# Patient Record
Sex: FEMALE | Race: NATIVE HAWAIIAN OR OTHER PACIFIC ISLANDER
[De-identification: names, ages, dates, MRNs, and addresses within clinical notes are randomized per-mention and may not be internally consistent; named-entity substitution may affect disease eponyms.]

---

## 2019-06-02 ENCOUNTER — HOSPITAL ENCOUNTER (INPATIENT)
Dept: HOSPITAL 5 - LD | Age: 29
LOS: 7 days | Discharge: HOME | End: 2019-06-09
Attending: OBSTETRICS & GYNECOLOGY | Admitting: OBSTETRICS & GYNECOLOGY
Payer: COMMERCIAL

## 2019-06-02 DIAGNOSIS — K66.0: ICD-10-CM

## 2019-06-02 DIAGNOSIS — K21.9: ICD-10-CM

## 2019-06-02 DIAGNOSIS — D64.9: ICD-10-CM

## 2019-06-02 DIAGNOSIS — O34.211: ICD-10-CM

## 2019-06-02 DIAGNOSIS — E66.01: ICD-10-CM

## 2019-06-02 DIAGNOSIS — Z3A.33: ICD-10-CM

## 2019-06-02 LAB
HCT VFR BLD CALC: 36.5 % (ref 30.3–42.9)
HGB BLD-MCNC: 12.9 GM/DL (ref 10.1–14.3)
MCHC RBC AUTO-ENTMCNC: 35 % (ref 30–34)
MCV RBC AUTO: 93 FL (ref 79–97)
PLATELET # BLD: 183 K/MM3 (ref 140–440)
RBC # BLD AUTO: 3.91 M/MM3 (ref 3.65–5.03)

## 2019-06-02 PROCEDURE — 82962 GLUCOSE BLOOD TEST: CPT

## 2019-06-02 PROCEDURE — 86592 SYPHILIS TEST NON-TREP QUAL: CPT

## 2019-06-02 PROCEDURE — 80053 COMPREHEN METABOLIC PANEL: CPT

## 2019-06-02 PROCEDURE — C1765 ADHESION BARRIER: HCPCS

## 2019-06-02 PROCEDURE — 76819 FETAL BIOPHYS PROFIL W/O NST: CPT

## 2019-06-02 PROCEDURE — 86850 RBC ANTIBODY SCREEN: CPT

## 2019-06-02 PROCEDURE — 85018 HEMOGLOBIN: CPT

## 2019-06-02 PROCEDURE — 76820 UMBILICAL ARTERY ECHO: CPT

## 2019-06-02 PROCEDURE — A4649 SURGICAL SUPPLIES: HCPCS

## 2019-06-02 PROCEDURE — 86901 BLOOD TYPING SEROLOGIC RH(D): CPT

## 2019-06-02 PROCEDURE — 85027 COMPLETE CBC AUTOMATED: CPT

## 2019-06-02 PROCEDURE — 85025 COMPLETE CBC W/AUTO DIFF WBC: CPT

## 2019-06-02 PROCEDURE — 76816 OB US FOLLOW-UP PER FETUS: CPT

## 2019-06-02 PROCEDURE — 85014 HEMATOCRIT: CPT

## 2019-06-02 PROCEDURE — 88307 TISSUE EXAM BY PATHOLOGIST: CPT

## 2019-06-02 PROCEDURE — 36415 COLL VENOUS BLD VENIPUNCTURE: CPT

## 2019-06-02 PROCEDURE — C9250 ARTISS FIBRIN SEALANT: HCPCS

## 2019-06-02 PROCEDURE — 83036 HEMOGLOBIN GLYCOSYLATED A1C: CPT

## 2019-06-02 PROCEDURE — 86900 BLOOD TYPING SEROLOGIC ABO: CPT

## 2019-06-02 RX ADMIN — VALACYCLOVIR HYDROCHLORIDE SCH MG: 500 TABLET, FILM COATED ORAL at 23:35

## 2019-06-02 RX ADMIN — ERYTHROMYCIN SCH MG: 250 TABLET, DELAYED RELEASE ORAL at 19:41

## 2019-06-02 RX ADMIN — AMPICILLIN SCH MLS/HR: 2 INJECTION, POWDER, FOR SOLUTION INTRAVENOUS at 19:46

## 2019-06-02 RX ADMIN — SODIUM CHLORIDE, SODIUM LACTATE, POTASSIUM CHLORIDE, AND CALCIUM CHLORIDE SCH MLS/HR: .6; .31; .03; .02 INJECTION, SOLUTION INTRAVENOUS at 19:44

## 2019-06-02 NOTE — HISTORY AND PHYSICAL REPORT
History of Present Illness


Date of examination: 19


Date of admission: 


19 16:00





Chief complaint: 


Leaking of fluid from vagina





History of present illness: 


28 year old  presents to L&D complaining of leaking of clear fluid from 

vagina for past hour. Patient denies contractions. Patient denies vaginal 

bleeding. Patient reports active fetal movement. 


Patient received prenatal care at [facility] and was able to look up 

prenatal records. 


LMP 18. EDC 19. 


Pregnancy significant for the following: Obesity (saw [facility]), history of 4 

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 

history of congenital heart disease, gestational diabetes (diet controlled per 

patient but not well controlled per sugar records on chart.)


Prenatal labs are as follows: A+, antibody screen negative, rubella immune, 

hepatitis B surface antigen negative, HIV negative, RPR nonreactive, quad screen

negative, GC negative, CT negative, GBS unknown (not done yet), 1 hour sugar 

test 205, HSV 2 positive serology. 








Past History


Past Medical History: other (obesity)


Past Surgical History:  section (history of  section times 4.)


GYN History: herpes (patient denies lesions or prodromal symptoms).  denies: 

chlamydia, gonorrhea, hepatitis B, HIV, syphilis, trichomonas


Family/Genetic History: diabetes, congenital heart defect


Social history: lives with family, full code.  denies: smoking, alcohol abuse, 

prescription drug abuse, IV drug use





- Obstetrical History


Expected Date of Delivery: 19


Actual Gestation: 33 Week(s) 3 Day(s) 


: 5


Para: 4


Hx # Term Pregnancies: 4


Number of  Pregnancies: 0


Spontaneous Abortions: 0


Induced : 0


Number of Living Children: 4





Medications and Allergies


                                    Allergies











Allergy/AdvReac Type Severity Reaction Status Date / Time


 


No Known Allergies Allergy   Verified 14 04:18











                                Home Medications











 Medication  Instructions  Recorded  Confirmed  Last Taken  Type


 


Butalbit/Acetamin/Caff/Codeine 1 each PO Q6HR #20 capsule 14  Unknown Rx





[Fioricet-Cod -08-30 Cap]     


 


Promethazine [Phenergan] 25 mg PO Q6H PRN #20 tablet 14  Unknown Rx


 


Sulfamethoxazole/Trimethoprim 1 each PO BID #14 tablet 14  Unknown Rx





[Bactrim Ds]     











Active Meds: 


Active Medications





Betamethasone Acet/Betameth SodPhos (Celestone Soluspan)  12 mg IM Q24HR MARVA


Diphenhydramine HCl (Benadryl)  12.5 mg IV Q2H PRN


   PRN Reason: Itching


Erythromycin (Shashi-Tab)  250 mg PO Q6HR MARVA


Hydromorphone HCl (Dilaudid)  0.5 mg IV Q5M PRN


   PRN Reason: Breakthrough Pain


Hydromorphone HCl (Dilaudid)  0.5 mg IV Q4H PRN


   PRN Reason: breakthrough pain > 7/10


Lactated Ringer's (Lactated Ringers)  1,000 mls @ 125 mls/hr IV AS DIRECT MARVA


Ampicillin Sodium (Ampicillin/Ns 2 Gm/100 Ml)  2 gm in 100 mls @ 100 mls/hr IV 

Q6HR MARVA; Protocol


Naloxone HCl (Narcan 0.4 Mg/1 Ml)  0.2 mg IV Q2MIN PRN


   PRN Reason: Res Rate </= 8 or 02 SAT < 92%


Promethazine HCl (Phenergan)  25 mg PO Q6H PRN


   PRN Reason: Nausea And Vomiting


Promethazine HCl (Phenergan)  25 mg IN Q6H PRN


   PRN Reason: Nausea And Vomiting


Sodium Chloride (Sodium Chloride Flush Syringe 10 Ml)  10 ml IV PRN NR


   Stop: 19 16:59











Review of Systems


All systems: negative (leaking of clear fluid from vagina)





- Physical Exam


Abdomen: Positive: normal appearance, soft.  Negative: distention, tenderness, 

guarding, rigidity


Genitourinary (Female): Positive: normal external genitalia, normal perenium.  

Negative: perineal/vulvar lesions (no lesions seen on careful exam with bright 

light upon admission)


Vagina: Positive: other (Large amount of clear fluid seen pooling in vagina on 

SSE.)


Uterus: Positive: enlarged.  Negative: tender


Anus/Rectum: Positive: normal perianal skin


Extremities: Positive: normal.  Negative: tenderness





- Obstetrical


FHR: category 1


Uterine Contraction Monitor Mode: External


Cervical Dilatation: 0


Cervical Effacement Percentage: 30


Fetal station: -4


Uterine Contraction Pattern: Irregular


Uterine Contraction Intensity: Mild





Results


                              Abnormal lab results











  19 Range/Units





  16:47 


 


POC Glucose  149 H  ()  








All other labs normal.








Assessment and Plan


A: Pregnancy at 33 weeks, 3 days gestation.


Spontaneous rupture of membranes, clear fluid. 


4 previous  sections.


Not in active labor.


GBS unknown.


Gestational diabetic, diet controlled.


HSV 2 serology positive, with no lesions or prodromal symptoms. 


Obesity.


Family history of congenital heart defect.


P: Admit. 


IV Ampicillin and Erythromycin (po Erythromycin substituted as no IV 

Erythromycin available at hospital). 


Valtrex suppression of HSV.


Celestone IM every 24 hours times 2 doses.


Blood glucose monitoring every 4 hours.


NPO. 


Consulted with Dr. Nieto re: this patient and informed him of patient's 

exam/SROM/history of C/S X4 and orders put in).

## 2019-06-02 NOTE — ANESTHESIA CONSULTATION
Anesthesia Consult and Med Hx


Date of service: 06/02/19





- Airway


Anesthetic Teeth Evaluation: Chipped


ROM Head & Neck: Adequate


Mental/Hyoid Distance: Adequate


Mallampati Class: Class III


Intubation Access Assessment: Probably Good





- Pulmonary Exam


CTA: Yes





- Cardiac Exam


Cardiac Exam: RRR





- Pre-Operative Health Status


ASA Pre-Surgery Classification: ASA3


Proposed Anesthetic Plan: Spinal





- Pulmonary


Hx Smoking: No


Hx Asthma: No


Hx Respiratory Symptoms: No


SOB: No


COPD: No


Home Oxygen Therapy: No


Hx Pneumonia: No


Hx Sleep Apnea: No





- Cardiovascular System


Hx Hypertension: No


Hx Coronary Artery Disease: No


Hx Heart Attack/AMI: No


Hx Angina: No


Hx Percutaneous Transluminal Coronary Angioplasty (PTCA): No


Hx Cardia Arrhythmia: No


Hx Pacemaker: No


Hx Internal Defibrillator: No


Hx Valvular Heart Disease: No


Hx Heart Murmur: No


Hx Peripheral Vascular Disease: No





- Central Nervous System


Hx Neuromuscular Disorder: No


Hx Seizures: No


CVA: No


Hx Back Pain: Yes


Hx Psychiatric Problems: No





- Gastrointestinal


Hx Ulcer: No


Hx Gastroesophageal Reflux Disease: Yes





- Endocrine


Hx Renal Disease: No


Hx End Stage Renal Disease: No


Hx Liver Disease: No


Hx Insulin Dependent Diabetes: No


Hx Non-Insulin Dependent Diabetes: Yes (gestational DM )


Hx Thyroid Disease: No


Hx Hypothyroidism: No


Hx Hyperthyroidism: No





- Hematic


Hx Anemia: No


Hx Sickle Cell Disease: No





- Other Systems


Hx Alcohol Use: No


Hx Substance Use: No


Hx Cancer: No


Hx Obesity: Yes (BMI 39.8)

## 2019-06-02 NOTE — ANESTHESIA DAY OF SURGERY
Anesthesia Day of Surgery





- Day of Surgery


Patient Examined: Yes


Patient H&P Reviewed: Yes


Patient is NPO: No (last meal 2pm)


Beta Blockers: No


Cardiac Clearance: No


Pulmonary Clearance: No


Arian's Test: N/A (pt not nicolas will hold on csection)

## 2019-06-03 RX ADMIN — ERYTHROMYCIN SCH MG: 250 TABLET, DELAYED RELEASE ORAL at 01:35

## 2019-06-03 RX ADMIN — SODIUM CHLORIDE, SODIUM LACTATE, POTASSIUM CHLORIDE, AND CALCIUM CHLORIDE SCH MLS/HR: .6; .31; .03; .02 INJECTION, SOLUTION INTRAVENOUS at 14:23

## 2019-06-03 RX ADMIN — AMPICILLIN SCH MLS/HR: 2 INJECTION, POWDER, FOR SOLUTION INTRAVENOUS at 01:40

## 2019-06-03 RX ADMIN — AMPICILLIN SCH MLS/HR: 2 INJECTION, POWDER, FOR SOLUTION INTRAVENOUS at 14:24

## 2019-06-03 RX ADMIN — ERYTHROMYCIN SCH MG: 250 TABLET, DELAYED RELEASE ORAL at 18:00

## 2019-06-03 RX ADMIN — ERYTHROMYCIN SCH MG: 250 TABLET, DELAYED RELEASE ORAL at 08:31

## 2019-06-03 RX ADMIN — SODIUM CHLORIDE, SODIUM LACTATE, POTASSIUM CHLORIDE, AND CALCIUM CHLORIDE SCH MLS/HR: .6; .31; .03; .02 INJECTION, SOLUTION INTRAVENOUS at 05:28

## 2019-06-03 RX ADMIN — VALACYCLOVIR HYDROCHLORIDE SCH MG: 500 TABLET, FILM COATED ORAL at 10:26

## 2019-06-03 RX ADMIN — ERYTHROMYCIN SCH MG: 250 TABLET, DELAYED RELEASE ORAL at 17:26

## 2019-06-03 RX ADMIN — AMPICILLIN SCH MLS/HR: 2 INJECTION, POWDER, FOR SOLUTION INTRAVENOUS at 08:31

## 2019-06-03 NOTE — ULTRASOUND REPORT
OB ULTRASOUND



History: Ruptured membranes.



Technique: Transabdominal ultrasound with Doppler interrogation.





Gestation: Single



Fetal Position: Breech



Amniotic Fluid: Decreased

  ZOE = 4.4 cm



Placenta: Anterior

  Placental Grade: 2



Fetal Heart Rate:

  166 BPM





BPD: 8.3 cm = 33 w 2 d



 HC: 30.9 cm = 34 w 3 d



 AC: 29.1 cm = 33 w 1 d



 FL: 7.7 cm = 39 w 1 d



HC/AC Ratio: 1.06



Cephalic Index: 76.8



Estimated Fetal Weight: 2573 grams





Clinical age = 33 w 4 d      EDC: 7/18/19



US Gest. Age = 35 w 0 d      EDC: 7/8/19





IMPRESSION: Viable, single intrauterine pregnancy as described.

## 2019-06-03 NOTE — ULTRASOUND REPORT
ULTRASOUND BIOPHYSICAL PROFILE:



History: fetal well being



Technique:  Transabdominal ultrasound with Doppler interrogation.



2 - Fetal breathing movements



2 - Fetal movements



2 - Fetal posture and tone



2 - Qualitative amniotic fluid volume



6 - TOTAL SCORE OF POSSIBLE 8



Fetal Heart Rate (bpm) 146

## 2019-06-03 NOTE — CONSULTATION
History of Present Illness


Reason for consult: PROM (28 year old  33.4 weeks  presents to L&D 

complaining of leaking of clear fluid from vagina on 19   Patient denies 

contractions. Patient denies vaginal bleeding. Patient reports active fetal 

movement.   Patient denies temp, chills , and ABD pain .  Patient was last seen 

by [facility] on 19 )





Past History


Past Medical History: other (obesity)


Past Surgical History:  section (history of  section times 4.)


GYN History: herpes (patient denies lesions or prodromal symptoms).  denies: 

chlamydia, gonorrhea, hepatitis B, HIV, syphilis, trichomonas


Family/Genetic History: diabetes, congenital heart defect





- Obstetrical History


: 5





Medications and Allergies


                                    Allergies











Allergy/AdvReac Type Severity Reaction Status Date / Time


 


No Known Allergies Allergy   Verified 14 04:18











                                Home Medications











 Medication  Instructions  Recorded  Confirmed  Last Taken  Type


 


No Known Home Medications [No  19 Unknown History





Reported Home Medications]     











Active Meds: 


Active Medications





Betamethasone Acet/Betameth SodPhos (Celestone Soluspan)  12 mg IM Q24H MARVA


   Stop: 19 16:59


Diphenhydramine HCl (Benadryl)  12.5 mg IV Q2H PRN


   PRN Reason: Itching


Erythromycin (Shashi-Tab)  250 mg PO Q6HR MARVA


   Last Admin: 19 08:31 Dose:  250 mg


   Documented by: 


Hydromorphone HCl (Dilaudid)  0.5 mg IV Q5M PRN


   PRN Reason: Breakthrough Pain


Hydromorphone HCl (Dilaudid)  0.5 mg IV Q4H PRN


   PRN Reason: breakthrough pain > 7/10


Lactated Ringer's (Lactated Ringers)  1,000 mls @ 125 mls/hr IV AS DIRECT MARVA


   Last Admin: 19 05:28 Dose:  125 mls/hr


   Documented by: 


Ampicillin Sodium (Ampicillin/Ns 2 Gm/100 Ml)  2 gm in 100 mls @ 100 mls/hr IV 

Q6HR MARVA; Protocol


   Last Admin: 19 08:31 Dose:  100 mls/hr


   Documented by: 


Naloxone HCl (Narcan 0.4 Mg/1 Ml)  0.2 mg IV Q2MIN PRN


   PRN Reason: Res Rate </= 8 or 02 SAT < 92%


Promethazine HCl (Phenergan)  25 mg PO Q6H PRN


   PRN Reason: Nausea And Vomiting


Promethazine HCl (Phenergan)  25 mg TN Q6H PRN


   PRN Reason: Nausea And Vomiting


Sodium Chloride (Sodium Chloride Flush Syringe 10 Ml)  10 ml IV PRN NR


   Stop: 19 16:59


Valacyclovir HCl (Valtrex)  500 mg PO BID MARVA


   Last Admin: 19 10:26 Dose:  500 mg


   Documented by: 











Review of Systems


Constitutional: no fever, no chills


Eyes: deferred


Ears, nose, mouth and throat: deferred


Respiratory: no shortness of breath


Breasts: deferred


Gastrointestinal: no abdominal pain


Rectal Exam: deferred


Musculoskeletal: no low back pain


Integumentary: no rash


Neurological: no headaches


Endocrine: recent glucocorticoid use (BMZ for FLM ), other (GDM under ADA diet )





- Vital Signs


Vital signs: 


                                   Vital Signs











Pulse BP


 


 84   119/79 


 


 19 19:34  19 19:34








                                        











Temp Pulse Resp BP Pulse Ox


 


 98.6 F   90   18   107/62    


 


 19 08:36  19 12:31  19 08:36  19 12:31   














- Physical Exam


Breasts: Positive: deferred


Cardiovascular: Regular rate


Lungs: Positive: Normal air movement


Abdomen: Positive: other (gravid).  Negative: tenderness, guarding


Uterus: Negative: tender


Deep Tendon Reflex Grade: Normal +2





- Obstetrical


FHR: category 1


Uterine Contraction Monitor Mode: External


Uterine Contraction Pattern: Absent





Results


Result Diagrams: 


                                 19 17:19





                              Abnormal lab results











  19 Range/Units





  16:47 17:19 22:12 


 


MCH   33 H   (28-32)  pg


 


MCHC   35 H   (30-34)  %


 


POC Glucose  149 H   147 H  ()  














  19 Range/Units





  01:57 02:05 08:39 


 


MCH     (28-32)  pg


 


MCHC     (30-34)  %


 


POC Glucose  128 H  127 H  126 H  ()  








All other labs normal.





Ultrasound: report reviewed (see [facility] US for full report )





Assessment and Plan





A: 


Pregnancy at 33 weeks, 4 days gestation.


Last [facility] appointment was 2 months ago ( 19) 


19 Spontaneous rupture of membranes ( assessed by [facility] ) 


NO sxs of chorio 


4 previous  sections.


Not in active labor.


GBS unknown.


Gestational diabetic overall adequate glycemic control under ADA diet 


HSV 2 serology positive, with no reported  lesions or prodromal symptoms. 


Morbid obesity 


Family history of congenital heart defect.


[facility] BPP assessment  however  was  noted ( ZOE measurement was not noted )




BMZ in progress 


ABX for latency 








P: 


In agreement with admission 


Continuous fetal monitoring 


Please call radiology to amend report 


NICU consult 


Obtain HgbA1C , CMP 


Complete BMZ for FLM 


Continue ABX 


2200 ADA diet 


Obtain FBS and PP glycemic levels 


IV Ampicillin and Erythromycin (po Erythromycin substituted as no IV 

Erythromycin available at hospital). 


Valtrex suppression of HSV.


Celestone IM every 24 hours times 2 doses.


As per ACOG timing of PPROM @ 34 weeks or with concern for chorio , fetal heart 

rate abnormalities , or standard ob indications

## 2019-06-03 NOTE — ULTRASOUND REPORT
ULTRASOUND OB VELOCIMETRY UMBILICAL ARTERY



HISTORY: Ruptured membranes.



TECHNIQUE: Transabdominal ultrasound. Spectral Doppler interrogation 

was performed on 3 segments of the umbilical cord.



FINDINGS:



Fetal heart rate measures 146 beats per minute.



The spectral waveforms are normal and persistent. No evidence for loss 

or reversal of end-diastolic flow.



The resistive index average measures 0.51.



The systolic/diastolic ratio average measures 2.04.



IMPRESSION:

Umbilical cord Doppler within normal limits.

## 2019-06-03 NOTE — PROGRESS NOTE
Assessment and Plan





- Patient Problems


(1) 33 weeks gestation of pregnancy


Current Visit: Yes   Status: Acute   





(2)  premature rupture of membranes


Current Visit: Yes   Status: Acute   


Plan to address problem: 





Continue antibiotics for latency.


Second steroid dose due today.


Plan repeat CD at 34 weeks.  Will order [facility] consult and comanage with maternal 

fetal medicine. 


NICU consult








(3) Previous  section


Current Visit: Yes   Status: Acute   


Plan to address problem: 





HIGH RISK - Placenta Accreta due to multiple previous CD


Order US to evaluate placenta


[facility] consult 


Plan repeat CD with additional staff available, high risk for  

hysterectomy. Need 2 large bore IV needles, cell saver and blood products as 

indicated.








(4) GDM (gestational diabetes mellitus)


Current Visit: Yes   Status: Acute   


Plan to address problem: 





Monitor blood glucose


Fasting 127


If continues to be uncontrolled will start insulin regimen below


   AM: NPH 40units Regular 20units


   PM: NPH 15units Regular 15 units








Objective





- Vital Signs


Vital Signs: 


                               Vital Signs - 12hr











  19





  23:23 08:36


 


Temperature 96 F L 98.6 F


 


Pulse Rate 88 


 


Respiratory 14 18





Rate  


 


Blood Pressure 137/67 


 


Blood Pressure 137/67 





[Left]  














- Labs


Labs: 


                                  Abnormal Labs











  19





  16:47 17:19 22:12


 


MCH   33 H 


 


MCHC   35 H 


 


POC Glucose  149 H   147 H














  19





  01:57 02:05


 


MCH  


 


MCHC  


 


POC Glucose  128 H  127 H








                         Laboratory Results - last 24 hr











  19





  16:47 17:19 17:19


 


WBC   7.5 


 


RBC   3.91 


 


Hgb   12.9 


 


Hct   36.5 


 


MCV   93 


 


MCH   33 H 


 


MCHC   35 H 


 


RDW   14.0 


 


Plt Count   183 


 


POC Glucose  149 H  


 


Blood Type    A POSITIVE


 


ALLAN Antibody Screen    Negative














  19





  22:12 01:57 02:05


 


WBC   


 


RBC   


 


Hgb   


 


Hct   


 


MCV   


 


MCH   


 


MCHC   


 


RDW   


 


Plt Count   


 


POC Glucose  147 H  128 H  127 H


 


Blood Type   


 


ALLAN Antibody Screen

## 2019-06-04 LAB
ALBUMIN SERPL-MCNC: 3 G/DL (ref 3.9–5)
ALT SERPL-CCNC: 12 UNITS/L (ref 7–56)
BUN SERPL-MCNC: 7 MG/DL (ref 7–17)
BUN/CREAT SERPL: 23 %
CALCIUM SERPL-MCNC: 8.6 MG/DL (ref 8.4–10.2)
HEMOLYSIS INDEX: 0

## 2019-06-04 RX ADMIN — ERYTHROMYCIN SCH MG: 250 TABLET, DELAYED RELEASE ORAL at 06:22

## 2019-06-04 RX ADMIN — AMPICILLIN SCH MLS/HR: 2 INJECTION, POWDER, FOR SOLUTION INTRAVENOUS at 00:03

## 2019-06-04 RX ADMIN — ERYTHROMYCIN SCH MG: 250 TABLET, DELAYED RELEASE ORAL at 18:09

## 2019-06-04 RX ADMIN — AMPICILLIN SCH MLS/HR: 2 INJECTION, POWDER, FOR SOLUTION INTRAVENOUS at 12:12

## 2019-06-04 RX ADMIN — SODIUM CHLORIDE, SODIUM LACTATE, POTASSIUM CHLORIDE, AND CALCIUM CHLORIDE SCH MLS/HR: .6; .31; .03; .02 INJECTION, SOLUTION INTRAVENOUS at 10:12

## 2019-06-04 RX ADMIN — VALACYCLOVIR HYDROCHLORIDE SCH MG: 500 TABLET, FILM COATED ORAL at 10:09

## 2019-06-04 RX ADMIN — ERYTHROMYCIN SCH MG: 250 TABLET, DELAYED RELEASE ORAL at 00:03

## 2019-06-04 RX ADMIN — ERYTHROMYCIN SCH MG: 250 TABLET, DELAYED RELEASE ORAL at 22:03

## 2019-06-04 RX ADMIN — AMPICILLIN SCH MLS/HR: 2 INJECTION, POWDER, FOR SOLUTION INTRAVENOUS at 18:09

## 2019-06-04 RX ADMIN — VALACYCLOVIR HYDROCHLORIDE SCH MG: 500 TABLET, FILM COATED ORAL at 00:03

## 2019-06-04 RX ADMIN — VALACYCLOVIR HYDROCHLORIDE SCH MG: 500 TABLET, FILM COATED ORAL at 22:03

## 2019-06-04 RX ADMIN — SODIUM CHLORIDE, SODIUM LACTATE, POTASSIUM CHLORIDE, AND CALCIUM CHLORIDE SCH MLS/HR: .6; .31; .03; .02 INJECTION, SOLUTION INTRAVENOUS at 18:33

## 2019-06-04 RX ADMIN — VALACYCLOVIR HYDROCHLORIDE SCH MG: 500 TABLET, FILM COATED ORAL at 00:17

## 2019-06-04 RX ADMIN — AMPICILLIN SCH MLS/HR: 2 INJECTION, POWDER, FOR SOLUTION INTRAVENOUS at 06:21

## 2019-06-04 RX ADMIN — ERYTHROMYCIN SCH MG: 250 TABLET, DELAYED RELEASE ORAL at 12:12

## 2019-06-04 RX ADMIN — SODIUM CHLORIDE, SODIUM LACTATE, POTASSIUM CHLORIDE, AND CALCIUM CHLORIDE SCH MLS/HR: .6; .31; .03; .02 INJECTION, SOLUTION INTRAVENOUS at 00:01

## 2019-06-04 NOTE — PROGRESS NOTE
Assessment and Plan





ASSESSMENT


   As stated above this is a 28 year old patient at 33.5 weeks with  pr

emature rupture of membranes and no current clinical evidence to support in-

utero infection.  











SUGGESTED MANAGEMENT PLAN


1.   s/p Betamethasone x 2


2.   Ongoing psychiatry follow-up.


3.   s/p Magnesium prophylaxis - done


4.   Antibiotics for latency


5.   Induction of labor at 34 weeks if undelivered or earlier for obstetrical 

indication.


6.   Continued admission 


7.   Bedrest in left lateral semi-Dickens position


8.   Assessment of blood pressures and temperature as per floor protocol


9.   Follow fetal status with fetal heart rate monitoring 


10.   Repeat CBC PRN to rule out infection 


11.   Periodic re-evaluation of amniotic fluid volume 


12.   Continuous FHR surveillance when and if patient complains of contractions.


13.   Tocodynametry to rule out uterine activity.


14.   I would withhold tocolysis with an SVE of > 4 cm dilation


15.   Neonatology should be present if delivery becomes imminent and particular 

attention should be given to fetal intracranial assessment.


16.   [facility] to follow





*As per a reading of the ACOG technical bulletin weve indicated that patients 

with  premature rupture of membranes should generally be delivered by 

approximate 34 weeks gestation. Please note that a suggested specific timing 

refers to a more defined timing and should be individualized based on patient 

criteria.





   REFERENCE:  Medically indicated late- and early-term deliveries. 

Committee Opinion No. [id number]. American College of Obstetricians [employer] Gynec[employer]. 

Obstet Gynecol 2013;121:37010.





Thank you for allowing us to participate in the care of this patient.  We look 

forward to the opportunity to assist in her continued management.  If you have 

any questions, please contact our office at [phone].








                                              _________________________________ 


                                                       Ted Dorman M.D.





Subjective





- Subjective


Date of service: 19


Principal diagnosis: PPROM at 33+ weeks


Interval history: 





Thank you for your recent consultation regarding the above named patient.  As 

you are aware, she is a 28 year old patient  at 33.5 weeks for whom I recently 

provided a perinatology consultation due to  premature rupture of 

membranes, 





Today she reports occasionally having leaking of fluid











Objective





- Vital Signs


Vital Signs: 


                               Vital Signs - 12hr











  19





  06:28 06:32 07:29


 


Temperature  98.9 F 98.1 F


 


Pulse Rate 73  74


 


Respiratory   18





Rate   


 


Blood Pressure 104/61  108/60


 


Blood Pressure   108/60





[Left]   














  19





  09:46 12:09 14:20


 


Temperature 97.4 F L 98.1 F 98.2 F


 


Pulse Rate  105 H 


 


Respiratory  18 





Rate   


 


Blood Pressure  108/60 


 


Blood Pressure  108/60 





[Left]   














- Labs


Labs: 


                                  Abnormal Labs











  19





  16:47 17:19 22:12


 


MCH   33 H 


 


MCHC   35 H 


 


Potassium   


 


Carbon Dioxide   


 


Creatinine   


 


Glucose   


 


POC Glucose  149 H   147 H


 


Total Protein   


 


Albumin   














  19





  01:57 02:05 08:39


 


MCH   


 


MCHC   


 


Potassium   


 


Carbon Dioxide   


 


Creatinine   


 


Glucose   


 


POC Glucose  128 H  127 H  126 H


 


Total Protein   


 


Albumin   














  19





  14:36 18:39 19:58


 


MCH   


 


MCHC   


 


Potassium   


 


Carbon Dioxide   


 


Creatinine   


 


Glucose   


 


POC Glucose  160 H  177 H  152 H


 


Total Protein   


 


Albumin   














  19





  07:38 10:25 10:42


 


MCH   


 


MCHC   


 


Potassium    3.5 L


 


Carbon Dioxide    20 L


 


Creatinine    0.3 L


 


Glucose    170 H


 


POC Glucose  146 H  163 H 


 


Total Protein    6.1 L


 


Albumin    3.0 L














  19





  14:30


 


MCH 


 


MCHC 


 


Potassium 


 


Carbon Dioxide 


 


Creatinine 


 


Glucose 


 


POC Glucose  132 H


 


Total Protein 


 


Albumin 








                         Laboratory Results - last 24 hr











  19





  18:39 19:58 07:38


 


Sodium   


 


Potassium   


 


Chloride   


 


Carbon Dioxide   


 


Anion Gap   


 


BUN   


 


Creatinine   


 


Estimated GFR   


 


BUN/Creatinine Ratio   


 


Glucose   


 


POC Glucose  177 H  152 H  146 H


 


Hemoglobin A1c   


 


Calcium   


 


Total Bilirubin   


 


AST   


 


ALT   


 


Alkaline Phosphatase   


 


Total Protein   


 


Albumin   


 


Albumin/Globulin Ratio   














  19





  10:25 10:42 10:42


 


Sodium   137 


 


Potassium   3.5 L 


 


Chloride   104.8 


 


Carbon Dioxide   20 L 


 


Anion Gap   16 


 


BUN   7 


 


Creatinine   0.3 L 


 


Estimated GFR   > 60 


 


BUN/Creatinine Ratio   23 


 


Glucose   170 H 


 


POC Glucose  163 H  


 


Hemoglobin A1c    5.3


 


Calcium   8.6 


 


Total Bilirubin   0.30 


 


AST   12 


 


ALT   12 


 


Alkaline Phosphatase   90 


 


Total Protein   6.1 L 


 


Albumin   3.0 L 


 


Albumin/Globulin Ratio   1.0 














  19





  14:30


 


Sodium 


 


Potassium 


 


Chloride 


 


Carbon Dioxide 


 


Anion Gap 


 


BUN 


 


Creatinine 


 


Estimated GFR 


 


BUN/Creatinine Ratio 


 


Glucose 


 


POC Glucose  132 H


 


Hemoglobin A1c 


 


Calcium 


 


Total Bilirubin 


 


AST 


 


ALT 


 


Alkaline Phosphatase 


 


Total Protein 


 


Albumin 


 


Albumin/Globulin Ratio

## 2019-06-05 RX ADMIN — ERYTHROMYCIN SCH MG: 250 TABLET, DELAYED RELEASE ORAL at 12:13

## 2019-06-05 RX ADMIN — SODIUM CHLORIDE, SODIUM LACTATE, POTASSIUM CHLORIDE, AND CALCIUM CHLORIDE SCH MLS/HR: .6; .31; .03; .02 INJECTION, SOLUTION INTRAVENOUS at 07:31

## 2019-06-05 RX ADMIN — AMPICILLIN SCH MLS/HR: 2 INJECTION, POWDER, FOR SOLUTION INTRAVENOUS at 00:08

## 2019-06-05 RX ADMIN — ERYTHROMYCIN SCH MG: 250 TABLET, DELAYED RELEASE ORAL at 17:48

## 2019-06-05 RX ADMIN — AMPICILLIN SCH MLS/HR: 2 INJECTION, POWDER, FOR SOLUTION INTRAVENOUS at 12:14

## 2019-06-05 RX ADMIN — ERYTHROMYCIN SCH MG: 250 TABLET, DELAYED RELEASE ORAL at 06:05

## 2019-06-05 RX ADMIN — VALACYCLOVIR HYDROCHLORIDE SCH MG: 500 TABLET, FILM COATED ORAL at 22:51

## 2019-06-05 RX ADMIN — VALACYCLOVIR HYDROCHLORIDE SCH MG: 500 TABLET, FILM COATED ORAL at 10:42

## 2019-06-05 RX ADMIN — AMPICILLIN SCH MLS/HR: 2 INJECTION, POWDER, FOR SOLUTION INTRAVENOUS at 06:04

## 2019-06-05 RX ADMIN — AMPICILLIN SCH MLS/HR: 2 INJECTION, POWDER, FOR SOLUTION INTRAVENOUS at 17:47

## 2019-06-05 RX ADMIN — SODIUM CHLORIDE, SODIUM LACTATE, POTASSIUM CHLORIDE, AND CALCIUM CHLORIDE SCH MLS/HR: .6; .31; .03; .02 INJECTION, SOLUTION INTRAVENOUS at 17:48

## 2019-06-05 NOTE — PROGRESS NOTE
Assessment and Plan





- Patient Problems


(1) 33 weeks gestation of pregnancy


Onset Date: 19   Current Visit: Yes   Status: Acute   


Plan to address problem: 


A:  IUP @ 33 6/7 weeks


      PPROM - stable


      Breech


      Previous C Section x 4





 P:  Continue present management


      Deliver by C Section tomorrow.








(2) GDM (gestational diabetes mellitus)


Onset Date: 19   Current Visit: Yes   Status: Acute   


Qualifiers: 


   Gestational diabetes mellitus control: diet-controlled   Trimester: third 

trimester   Qualified Code(s): O24.410 - Gestational diabetes mellitus in 

pregnancy, diet controlled   





(3)  premature rupture of membranes


Onset Date: 19   Current Visit: Yes   Status: Acute   


Qualifiers: 


   PROM onset of labor timing: onset of labor more than 24 hours following 

rupture   Qualified Code(s): O42.119 -  premature rupture of membranes, 

onset of labor more than 24 hours following rupture, unspecified trimester   





(4) Previous  section


Onset Date: 19   Current Visit: Yes   Status: Chronic   





Subjective





- Subjective


Date of service: 19


Principal diagnosis: PPROM @ 33 6/7 weeks


Interval history: 





Pt is a 28 year old HF  who presented to L&D complaining of leaking of 

clear fluid from vagina 19. She denies contractions or vaginal bleeding. P

atient reports active fetal movement. She is scheduled for a Repeat C Section 

tomorrow @ 34 0/7 weeks.


Patient reports: loss of fluid, fetal movement normal, no new complaints, no 

vaginal bleeding, no contractions





Objective





- Vital Signs


Vital Signs: 


                               Vital Signs - 12hr











  19





  03:13 04:30 06:01


 


Temperature 97.9 F 98.0 F 


 


Pulse Rate   83


 


Respiratory   





Rate   


 


Blood Pressure   148/59


 


Blood Pressure   





[Left]   














  19





  06:02 06:03 07:32


 


Temperature  97.8 F 


 


Pulse Rate 78 78 69


 


Respiratory  18 





Rate   


 


Blood Pressure 123/75  106/64


 


Blood Pressure  123/75 





[Left]   














  19





  07:37 12:30


 


Temperature 98.2 F 


 


Pulse Rate 69 81


 


Respiratory 16 





Rate  


 


Blood Pressure  108/62


 


Blood Pressure 106/64 





[Left]  














- Exam


Abdomen: Present: normal appearance, soft


Uterus: Present: normal


FHR: category 1


Uterine Contraction Monitor Mode: External


Uterine Contraction Pattern: Absent





- Labs


Labs: 


                                  Abnormal Labs











  19





  16:47 17:19 22:12


 


MCH   33 H 


 


MCHC   35 H 


 


Potassium   


 


Carbon Dioxide   


 


Creatinine   


 


Glucose   


 


POC Glucose  149 H   147 H


 


Total Protein   


 


Albumin   














  19





  01:57 02:05 08:39


 


MCH   


 


MCHC   


 


Potassium   


 


Carbon Dioxide   


 


Creatinine   


 


Glucose   


 


POC Glucose  128 H  127 H  126 H


 


Total Protein   


 


Albumin   














  19





  14:36 18:39 19:58


 


MCH   


 


MCHC   


 


Potassium   


 


Carbon Dioxide   


 


Creatinine   


 


Glucose   


 


POC Glucose  160 H  177 H  152 H


 


Total Protein   


 


Albumin   














  19





  07:38 10:25 10:42


 


MCH   


 


MCHC   


 


Potassium    3.5 L


 


Carbon Dioxide    20 L


 


Creatinine    0.3 L


 


Glucose    170 H


 


POC Glucose  146 H  163 H 


 


Total Protein    6.1 L


 


Albumin    3.0 L














  19





  14:30 19:12 08:07


 


MCH   


 


MCHC   


 


Potassium   


 


Carbon Dioxide   


 


Creatinine   


 


Glucose   


 


POC Glucose  132 H  152 H  115 H


 


Total Protein   


 


Albumin   














  19





  12:30


 


MCH 


 


MCHC 


 


Potassium 


 


Carbon Dioxide 


 


Creatinine 


 


Glucose 


 


POC Glucose  157 H


 


Total Protein 


 


Albumin 








                         Laboratory Results - last 24 hr











  19





  14:30 19:12 08:07


 


POC Glucose  132 H  152 H  115 H














  19





  12:30


 


POC Glucose  157 H














- Results


US- obstetric: report reviewed ([facility] ; Breech)

## 2019-06-06 LAB
BASOPHILS # (AUTO): 0 K/MM3 (ref 0–0.1)
BASOPHILS NFR BLD AUTO: 0.2 % (ref 0–1.8)
EOSINOPHIL # BLD AUTO: 0 K/MM3 (ref 0–0.4)
EOSINOPHIL NFR BLD AUTO: 0.1 % (ref 0–4.3)
HCT VFR BLD CALC: 33.3 % (ref 30.3–42.9)
HCT VFR BLD CALC: 35.4 % (ref 30.3–42.9)
HGB BLD-MCNC: 11.2 GM/DL (ref 10.1–14.3)
HGB BLD-MCNC: 12.1 GM/DL (ref 10.1–14.3)
LYMPHOCYTES # BLD AUTO: 2 K/MM3 (ref 1.2–5.4)
LYMPHOCYTES NFR BLD AUTO: 23.9 % (ref 13.4–35)
MCHC RBC AUTO-ENTMCNC: 34 % (ref 30–34)
MCV RBC AUTO: 94 FL (ref 79–97)
MONOCYTES # (AUTO): 0.6 K/MM3 (ref 0–0.8)
MONOCYTES % (AUTO): 7.4 % (ref 0–7.3)
PLATELET # BLD: 174 K/MM3 (ref 140–440)
RBC # BLD AUTO: 3.78 M/MM3 (ref 3.65–5.03)

## 2019-06-06 RX ADMIN — AMPICILLIN SCH MLS/HR: 2 INJECTION, POWDER, FOR SOLUTION INTRAVENOUS at 00:16

## 2019-06-06 RX ADMIN — SODIUM CHLORIDE, SODIUM LACTATE, POTASSIUM CHLORIDE, AND CALCIUM CHLORIDE SCH MLS/HR: .6; .31; .03; .02 INJECTION, SOLUTION INTRAVENOUS at 00:14

## 2019-06-06 RX ADMIN — ERYTHROMYCIN SCH MG: 250 TABLET, DELAYED RELEASE ORAL at 05:33

## 2019-06-06 RX ADMIN — ERYTHROMYCIN SCH MG: 250 TABLET, DELAYED RELEASE ORAL at 00:16

## 2019-06-06 RX ADMIN — AMPICILLIN SCH MLS/HR: 2 INJECTION, POWDER, FOR SOLUTION INTRAVENOUS at 05:34

## 2019-06-06 RX ADMIN — SODIUM CHLORIDE, SODIUM LACTATE, POTASSIUM CHLORIDE, AND CALCIUM CHLORIDE SCH MLS/HR: .6; .31; .03; .02 INJECTION, SOLUTION INTRAVENOUS at 08:57

## 2019-06-06 RX ADMIN — SODIUM CHLORIDE, SODIUM LACTATE, POTASSIUM CHLORIDE, AND CALCIUM CHLORIDE SCH MLS/HR: .6; .31; .03; .02 INJECTION, SOLUTION INTRAVENOUS at 08:08

## 2019-06-06 NOTE — OPERATIVE REPORT
Operative Report


Operative Report: 








DATE OF OPERATION   19





PREOP Diagnosis


1. 34 0/7 weeks gestation


2. Prelabor  rupture of membranes


3. Gestational diabetes


4. Morbid obesity


6. Previous  section x 4


7. Malpresentation





Postop Diagnosis


1. 34 0/7 weeks gestation


2. Prelabor  rupture of membranes


3. Gestational diabetes


4. Morbid obesity


6. Previous  section x 4


7. Malpresentation


8. Right hydrosalpinx





Procedure: Repeat transverse  section (classical)





Findings


1. Viable male infant in the breech presentationm  weighing 4lb 14 oz, 2206g 

APGARS 8 at 1 min, 9 at 5 min


2. Pelvic adhesions


3. Complete obliteration of left adnexa


4. Partial obliteration of right adnexa








Surgeon


1. Anisa Sanford MD





Anesthesia:


1. Epidural 





I/O:


EBL: 1300ml


Cell saver: 250ml (returned to patient)


UOP: 450ml, clear urine








Specimens removed:


1. Placenta - partially adhered to uterus, manually removed with uterine 

exploration





Complications: none





Disposition: Patient taken to recovery room in stable condition





INDICATIONS:


The patient is a 27yo  at 34 weeks that was managed for prelabor  

rupture of membranes.  She received antibiotics for latency and plan was to do 

repeat csection at 34 weeks per ACOG practice guidelines.  The patient was 

consented and the risks including but not limited to bleeding, infections, 

injury to surrounding organs, potential injury to mother/infant were discussed. 

Special emphasis was taken to express the increased risk of surgery after four 

previous abdominal surgereries.  Patient acknowledged understanding and all 

questions were answered and informed consent signed. 





She desired a tubal ligation and consent was signed 2019. The risk of pregnanc

y failure and ectopic pregnancy were discussed as well. 











PROCEDURE:


The patient was taken to OR 11 in stable condition.  Adequate anesthesia was 

achieved with epidural anesthesia. A la catheter was placed.  She wore SCDs 

for DVT prophylaxis.  And received Ancef for infection prophylaxis. The patient 

was prepped and draped in the usual fashion and an additional time out was done.

A Pfannestiel incision was made over the previous uterine scar.  The fascia was 

incised and the incision extended laterally.  The  superior and aspect of the 

rectus muscle was dissected off of the fascia.  The fascia inferior to the 

incision was densely scared to rectus an bladder.   Entry into the peritoneum 

was achieved. Due to dense pelvic adhesions the decision was made to extend the 

rectus muscle laterally to aide in visualization.  The uterus was encased in 

peritoneum adhered to omentum and bladder.  A transverse incision made made in 

the uterus and extended laterally.   The membranes were adherent to the fetal 

body and no amniotic fluid as noted.  The fetus was noted to be in breech 

presentation, The fetal lower extremities were delivered by sweeping the leg 

across the torso and out the body.  The same technique was implored to deliver 

the right extremity.  The torso was elevated out of the abdomen and the left 

upper extremity was delivered by sweeping down and out the uterus.  The head the

n spontaneously delivered.  The infant was bulb suctioned and delayed cord 

clamping was done for 45 seconds.  The infant was then handed over to the 

awaiting NICU staff. 





The placenta was delivered and a portion of placenta was noted to be adherent to

the uterus.  That portion of placenta was manually removed. The uterus was 

repaired in multiple figure of 8 and continuous sutures in multiple layers.  The

right tube was noted to be bleeding and dilated.  A Silvestre clamp was placed over

the left tube and the Ligasure Enseal device was used to clamp, cauterize and 

cut the tube to aid in hemostasis.  An attempt was made to remove the right tube

but it was densely adhered to the lateral peritoneum.  The right and left adnexa

were observed and it was noted that  due to pelvic adhesions there was no way to

safely do a bilateral tubal ligation 








The patient received Pitocin IV and Methergine 0.2mg IM.  Lyndsey, Surgicel and 

Tisseal fibrin sealant 20ml  were used to aid in hemostasis. 





The uterus was cleaned of all clots.  The uterus was repaired with 0-Vicryl in a

running, locked stitch and an imbricating layer of the same suture was used.  

Interceed was placed between the uterine and rectus muscle planes.  The fascia 

was closed with 0 Vicryl. Subcutaneous layer reapproximated with 2-0 Vicryl. 

Skin closed with 4-0 Vicryl.





The patient tolerated the procedure well.  All counts were correct x 3.  Urine 

was noted to be clear at close of case.  I was present and scrubbed for the 

entire procedure.








The patient was taken to the recovery room in stable condition.

## 2019-06-07 LAB
HCT VFR BLD CALC: 25.3 % (ref 30.3–42.9)
HGB BLD-MCNC: 8.7 GM/DL (ref 10.1–14.3)

## 2019-06-07 RX ADMIN — OXYCODONE AND ACETAMINOPHEN PRN TAB: 5; 325 TABLET ORAL at 06:05

## 2019-06-07 RX ADMIN — IBUPROFEN PRN MG: 800 TABLET, FILM COATED ORAL at 16:40

## 2019-06-07 RX ADMIN — VALACYCLOVIR HYDROCHLORIDE SCH MG: 500 TABLET, FILM COATED ORAL at 12:10

## 2019-06-07 RX ADMIN — SODIUM CHLORIDE, SODIUM LACTATE, POTASSIUM CHLORIDE, AND CALCIUM CHLORIDE SCH MLS/HR: .6; .31; .03; .02 INJECTION, SOLUTION INTRAVENOUS at 01:44

## 2019-06-07 RX ADMIN — OXYCODONE AND ACETAMINOPHEN PRN TAB: 5; 325 TABLET ORAL at 16:40

## 2019-06-07 RX ADMIN — IBUPROFEN PRN MG: 800 TABLET, FILM COATED ORAL at 06:04

## 2019-06-07 RX ADMIN — VALACYCLOVIR HYDROCHLORIDE SCH MG: 500 TABLET, FILM COATED ORAL at 21:46

## 2019-06-07 RX ADMIN — FERROUS SULFATE TAB 325 MG (65 MG ELEMENTAL FE) SCH MG: 325 (65 FE) TAB at 21:46

## 2019-06-07 RX ADMIN — FERROUS SULFATE TAB 325 MG (65 MG ELEMENTAL FE) SCH MG: 325 (65 FE) TAB at 12:00

## 2019-06-07 NOTE — PROGRESS NOTE
Assessment and Plan





A: POD#1 s/p Repeat C/S


    Pain well controlled


    Asymptomatic Anemia


     Infant in NICU 


    Stable





P: Routine PO orders


    Abdominal binder


    Infed 100mg IM x1


    Anticiapte discharge home in 24-48 hrs


  





Pt informed or classical incision, large amt scar tissue which prevented tubal 

ligation from being done. Pt advised she should NOT get pregnant again due to 

multiple uterine scars, adhesions, and risk of uterine rupture. 





Subjective





- Subjective


Principal diagnosis: POD#1 s/p Repeat C/S @33 6/7 weeks


Interval history: 





See H&P 


Patient reports: appetite normal, voiding normally, pain well controlled, 

flatus, ambulating normally, no bowel movement


: in NICU ()





Objective





- Vital Signs


Latest vital signs: 


                                   Vital Signs











  Temp Pulse Resp BP BP Pulse Ox


 


 19 07:29  97.7 F  99 H  18   120/67 


 


 19 04:00  98.6 F  77  18   99/78 


 


 19 00:58  98.6 F   18  117/66  


 


 19 20:23  98.3 F   18  105/63  


 


 19 17:52  98.1 F  90  18   111/75  96


 


 19 17:01   86  18  107/65   99


 


 19 16:31  97.4 F L  88  22  109/66   99


 


 19 16:16   85  16  101/68   98


 


 19 16:11   86  16  99/61   100


 


 19 16:06   85  18  117/67   100


 


 19 16:01  97.2 F L  86  18  101/67   100








                                Intake and Output











 19





 23:59 07:59 15:59


 


Intake Total 1200 780 


 


Output Total 1000 1800 


 


Balance 200 -1020 


 


Intake:   


 


  IV 1200  


 


    Lactated Ringers 1,000 ml 1000  





    @ 125 mls/hr IV AS   





    [facility] Rx#:[id number]   


 


  Oral  480 


 


  Intake, Free Water  300 


 


Output:   


 


  Urine 1000 1800 


 


    Indwelling Catheter 800 1800 


 


Other:   


 


  Total, Intake Amount  480 


 


  Total, Output Amount 800 900 














- Exam


Cardiovascular: Present: Regular rate, Normal S1, Normal S2, No murmurs


Lungs: Present: Clear to auscultation, Normal air movement


Abdomen: Present: normal appearance, soft, tenderness (as expected post-op), 

normal bowel sounds.  Absent: distention


Vulva: both: normal


Uterus: Present: firm, fundal height below umbilicus (-2)


Extremities: Present: normal


Incision: Present: normal, dry, intact, dressed (Pressure dressing intact)





- Labs


Labs: 


                              Abnormal lab results











  19 Range/Units





  17:02 05:58 


 


Hgb   8.7 L  (10.1-14.3)  gm/dl


 


Hct   25.3 L D  (30.3-42.9)  %


 


POC Glucose  143 H   ()

## 2019-06-07 NOTE — EVENT NOTE
Please note a tubal ligation was not done due to scar tissue/adhesions and it 

could not be done safely.   Also of note she has had  a classical  

section and labor is contraindicated in future pregnancies.

## 2019-06-08 RX ADMIN — FERROUS SULFATE TAB 325 MG (65 MG ELEMENTAL FE) SCH MG: 325 (65 FE) TAB at 10:44

## 2019-06-08 RX ADMIN — FERROUS SULFATE TAB 325 MG (65 MG ELEMENTAL FE) SCH MG: 325 (65 FE) TAB at 22:07

## 2019-06-08 RX ADMIN — IBUPROFEN PRN MG: 800 TABLET, FILM COATED ORAL at 10:44

## 2019-06-08 RX ADMIN — VALACYCLOVIR HYDROCHLORIDE SCH MG: 500 TABLET, FILM COATED ORAL at 14:29

## 2019-06-08 RX ADMIN — VALACYCLOVIR HYDROCHLORIDE SCH MG: 500 TABLET, FILM COATED ORAL at 10:44

## 2019-06-08 NOTE — PROGRESS NOTE
Assessment and Plan


A: Postpartum/postop day 2 S/P repeat classical  section. 


Anemia secondary to pregnancy and blood loss. 


P: Continue iron supplementation. 


Continue ambulation. 


Anticipate discharge tomorrow if patient continues to do well. 








Subjective





- Subjective


Date of service: 19


Principal diagnosis: Postpartum/postop day 2 S/P repeat classical C/S


Interval history: 


Postpartum/postop day 2 S/P repeat classical C/S.


Patient is doing well. She reports a small amount of lochia. 


Voiding without difficulty; ambulating well. Passing gas. Tolerating a regular 

diet without nausea or vomiting.


Patient denies headache, dizziness, chest pain, cough, shortness of breath, leg 

pain, abdominal pain, or heavy vaginal bleeding. 


Patient reports: appetite normal, voiding normally, pain well controlled, 

flatus, ambulating normally, no dizzy ambulation, no nauseated


: doing well, in NICU





Objective





- Vital Signs


Latest vital signs: 


                                   Vital Signs











  Temp Pulse Resp BP


 


 19 08:45  98.1 F  102 H  20  116/57


 


 19 00:00  98.7 F  77  16  101/66


 


 19 16:32  98.3 F  123 H  18  123/69


 


 19 13:07  98.4 F  96 H  18  105/62








                                Intake and Output











 19





 23:59 07:59 15:59


 


Intake Total 360 500 300


 


Output Total  450 


 


Balance 360 50 300


 


Intake:   


 


  Oral 360 200 300


 


  Intake, Free Water  300 


 


Output:   


 


  Urine  450 


 


    Void  450 


 


Other:   


 


  Total, Intake Amount 360 200 300


 


  Total, Output Amount  250 


 


  # Voids   


 


    Void  1 1














- Exam


Cardiovascular: Present: Regular rate, Normal S1, Normal S2


Lungs: Present: Clear to auscultation


Abdomen: Present: normal appearance, soft, normal bowel sounds.  Absent: 

distention, tenderness, guarding, rigidity


Uterus: Present: normal, firm, fundal height below umbilicus.  Absent: 

bogginess, tenderness


Extremities: Present: normal.  Absent: tenderness


Incision: Present: normal, dry, intact





- Labs


Labs: 


                              Abnormal lab results











  19 Range/Units





  12:56 18:21 


 


POC Glucose  120 H  153 H  ()

## 2019-06-09 VITALS — SYSTOLIC BLOOD PRESSURE: 114 MMHG | DIASTOLIC BLOOD PRESSURE: 74 MMHG

## 2019-06-09 RX ADMIN — FERROUS SULFATE TAB 325 MG (65 MG ELEMENTAL FE) SCH MG: 325 (65 FE) TAB at 09:51

## 2019-06-09 RX ADMIN — VALACYCLOVIR HYDROCHLORIDE SCH MG: 500 TABLET, FILM COATED ORAL at 03:09

## 2019-06-09 RX ADMIN — VALACYCLOVIR HYDROCHLORIDE SCH MG: 500 TABLET, FILM COATED ORAL at 09:51

## 2019-06-09 RX ADMIN — OXYCODONE AND ACETAMINOPHEN PRN TAB: 5; 325 TABLET ORAL at 09:52

## 2019-06-09 NOTE — PROGRESS NOTE
Assessment and Plan


A: Postpartum/postop day 3 S/P repeat classical  section.


Anemia secondary to pregnancy and blood loss. 


P: Discharge patient home. Postpartum/postop discharge instructions and warning 

signs discussed with patient. Care of incision and activity restrictions 

discussed with patient. Advised patient to continue taking her prenatal vitamins

and iron supplements at home. Advised patient to avoid intercourse, driving, 

stair climbing, lifting and housework, and tub baths (patient may take showers).

Advised patient to follow up at [facility] in 1 week for incision check. 

Advised pt. to continue to follow healthy diet and avoid sweets and processed 

foods. Patient voiced understanding of all instructions. 








Subjective





- Subjective


Date of service: 19


Principal diagnosis: Postpartum/postop day 3 S/P repeat classical C/S


Interval history: 


Postpartum/postop day 3 S/P repeat classical C/S. Patient desires discharge 

today. 


Patient is doing well. She reports a small amount of lochia. 


Voiding without difficulty; ambulating well. Passing gas. Tolerating a regular 

diet without nausea or vomiting.


Patient denies headache, dizziness, chest pain, cough, shortness of breath, leg 

pain, abdominal pain, or heavy vaginal bleeding. 


Patient had an elevated blood sugar yesterday but it was right after she drank a

soda and a large cup of apple juice. Instructed pt. she still needs to avoid 

sweets, sodas and processed foods and follow a healthier diet. 


Patient reports: appetite normal, voiding normally, pain well controlled, 

flatus, ambulating normally, no dizzy ambulation, no nauseated


: doing well, in NICU





Objective





- Vital Signs


Latest vital signs: 


                                   Vital Signs











  Temp Pulse Resp BP


 


 19 09:52    20 


 


 19 08:20  98.2 F  89  20  112/56


 


 19 00:00  98.4 F  66  18  107/75


 


 19 16:00  97.6 F  101 H  20  106/56








                                Intake and Output











 19





 23:59 07:59 15:59


 


Intake Total 400 300 120


 


Balance 400 300 120


 


Intake:   


 


  Oral 400  120


 


  Intake, Free Water  300 


 


Other:   


 


  Total, Intake Amount 280  120


 


  # Voids   


 


    Void 1  1














- Exam


Cardiovascular: Present: Regular rate, Normal S1, Normal S2, No murmurs


Lungs: Present: Clear to auscultation


Abdomen: Present: normal appearance, soft, normal bowel sounds.  Absent: 

distention, tenderness, guarding, rigidity


Uterus: Present: normal, firm, fundal height below umbilicus.  Absent: 

bogginess, tenderness


Extremities: Present: normal.  Absent: tenderness


Incision: Present: normal, dry, intact





- Labs


Labs: 


                              Abnormal lab results











  19 Range/Units





  14:04 18:02 20:13 


 


POC Glucose  214 H  177 H  150 H  ()  














  19 Range/Units





  22:15 


 


POC Glucose  117 H  ()

## 2019-06-09 NOTE — DISCHARGE SUMMARY
Providers





- Providers


Date of Admission: 


19 16:00





Date of discharge: 19


Attending physician: 


HILLARY YATES MD





                                        





19 10:56


Consult to Physician [CONS] Urgent 


   Comment: 


   Consulting Provider: JESSICA CARLOS


   Physician Instructions: Evaluate for placenta accreta, US ordered


   Reason For Exam: Prelabor PROM, 4 previous cs, GDM











Primary care physician: 


HILLARY YATES MD








Hospitalization


Reason for admission: rupture of membranes


Delivery: 


Procedure: other (repeat classical (transverse)  section)


Incision: normal, dry, intact


Other postpartum procedures: none


Postpartum complications: none


Discharge diagnosis:  delivery


 baby: male


Pertinent studies: 


Labs, ultrasound





Hospital course: 


Normal hospital course





Condition at discharge: Good


Disposition: [place]-01 TO HOME OR SELFCARE





- Discharge Diagnoses


(1)  delivery


Status: Acute   





(2) Anemia due to blood loss


Status: Acute   





Plan





- Discharge Medications


Prescriptions: 


Ferrous Sulfate [Feosol 325 MG tab] 325 mg PO BID 30 Days #60 tablet


Ibuprofen [Motrin 800 MG tab] 800 mg PO Q8HR PRN 30 Days #30 tablet


 PRN Reason: Pain, Moderate (4-6)


oxyCODONE /ACETAMINOPHEN [Percocet 5/325] 1 tab PO Q4HR PRN 14 Days #30 tab


 PRN Reason: Pain , Severe (7-10)





- Provider Discharge Summary


Activity: routine, no sex for 6 weeks, no heavy lifting 4 weeks, no strenuous 

exercise


Diet: routine


Instructions: routine


Additional instructions: 


Continue taking your prenatal vitamins and iron supplements at home. 


Call your doctor immediately for:


* Fever > 100.5


* Heavy vaginal bleeding ( >1 pad per hour)


* Severe persistent headache


* Shortness of breath


* Reddened, hot, painful area to leg or breast


* Drainage or odor from incision.





* Keep incision clean and dry at all times and follow doctor's instructions 

regarding bathing/showering











- Follow up plan


Follow up: 


HILLARY YATES MD [Primary Care Provider] - 7 Days